# Patient Record
Sex: MALE | Race: AMERICAN INDIAN OR ALASKA NATIVE
[De-identification: names, ages, dates, MRNs, and addresses within clinical notes are randomized per-mention and may not be internally consistent; named-entity substitution may affect disease eponyms.]

---

## 2021-01-08 ENCOUNTER — HOSPITAL ENCOUNTER (EMERGENCY)
Dept: HOSPITAL 5 - ED | Age: 31
Discharge: HOME | End: 2021-01-08
Payer: SELF-PAY

## 2021-01-08 VITALS — SYSTOLIC BLOOD PRESSURE: 153 MMHG | DIASTOLIC BLOOD PRESSURE: 96 MMHG
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
## 2021-01-08 DIAGNOSIS — F17.200: ICD-10-CM
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
## 2021-01-08 DIAGNOSIS — G44.209: ICD-10-CM

## 2021-01-08 DIAGNOSIS — S16.1XXA: Primary | ICD-10-CM
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
## 2021-01-08 DIAGNOSIS — Z79.899: ICD-10-CM

## 2021-01-08 PROCEDURE — 99282 EMERGENCY DEPT VISIT SF MDM: CPT
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
